# Patient Record
Sex: MALE | Race: WHITE | NOT HISPANIC OR LATINO | Employment: FULL TIME | URBAN - METROPOLITAN AREA
[De-identification: names, ages, dates, MRNs, and addresses within clinical notes are randomized per-mention and may not be internally consistent; named-entity substitution may affect disease eponyms.]

---

## 2021-05-26 ENCOUNTER — RECORDS - HEALTHEAST (OUTPATIENT)
Dept: ADMINISTRATIVE | Facility: CLINIC | Age: 46
End: 2021-05-26

## 2021-05-27 ENCOUNTER — RECORDS - HEALTHEAST (OUTPATIENT)
Dept: ADMINISTRATIVE | Facility: CLINIC | Age: 46
End: 2021-05-27

## 2021-05-28 ENCOUNTER — RECORDS - HEALTHEAST (OUTPATIENT)
Dept: ADMINISTRATIVE | Facility: CLINIC | Age: 46
End: 2021-05-28

## 2022-05-24 ENCOUNTER — HOSPITAL ENCOUNTER (EMERGENCY)
Facility: HOSPITAL | Age: 47
Discharge: HOME | End: 2022-05-24
Attending: STUDENT IN AN ORGANIZED HEALTH CARE EDUCATION/TRAINING PROGRAM
Payer: COMMERCIAL

## 2022-05-24 VITALS
WEIGHT: 215 LBS | HEIGHT: 68 IN | TEMPERATURE: 98.4 F | SYSTOLIC BLOOD PRESSURE: 151 MMHG | BODY MASS INDEX: 32.58 KG/M2 | RESPIRATION RATE: 16 BRPM | DIASTOLIC BLOOD PRESSURE: 78 MMHG | HEART RATE: 54 BPM | OXYGEN SATURATION: 98 %

## 2022-05-24 NOTE — ED TRIAGE NOTES
"Patient has been having left side groin pain for a bout a week and a half for \"no reason at all\". Also is having a cough for a few days.       "

## 2022-05-24 NOTE — ED PROVIDER NOTES
EMERGENCY DEPARTMENT ENCOUNTER      NAME: Matthew Rubio  AGE: 47 year old male  YOB: 1975  MRN: [id number]  EVALUATION DATE & TIME: No admission date for patient encounter.    PCP: No Ref-Primary, Physician    ED PROVIDER: Luiz Toribio M.D.      Chief Complaint   Patient presents with     Groin Pain         FINAL IMPRESSION:      ED COURSE & MEDICAL DECISION MAKING:    Pertinent Labs & Imaging studies reviewed. (See chart for details)      8:43 AM Called from Triage but no answer.  Patient not in waiting room        MEDICATIONS GIVEN IN THE EMERGENCY:  Medications - No data to display    NEW PRESCRIPTIONS STARTED AT TODAY'S ER VISIT  New Prescriptions    No medications on file          I, Eduar Franks, am serving as a scribe to document services personally performed by Dr. Luiz Toribio based on my observation and the provider's statements to me. I, Luiz Toribio MD attest that Eduar Franks is acting in a scribe capacity, has observed my performance of the services and has documented them in accordance with my direction.    Luiz Toribio M.D.  Emergency Medicine  [facility] EMERGENCY DEPARTMENT  [facility] [facility] [id number]  [phone]  Dept: [phone]     Luiz Toribio MD  05/28/22 4526